# Patient Record
Sex: MALE | Race: WHITE
[De-identification: names, ages, dates, MRNs, and addresses within clinical notes are randomized per-mention and may not be internally consistent; named-entity substitution may affect disease eponyms.]

---

## 2019-12-07 ENCOUNTER — HOSPITAL ENCOUNTER (INPATIENT)
Dept: HOSPITAL 92 - ERS | Age: 79
LOS: 3 days | Discharge: HOME | DRG: 291 | End: 2019-12-10
Attending: INTERNAL MEDICINE | Admitting: INTERNAL MEDICINE
Payer: MEDICARE

## 2019-12-07 VITALS — BODY MASS INDEX: 32.1 KG/M2

## 2019-12-07 DIAGNOSIS — N18.2: ICD-10-CM

## 2019-12-07 DIAGNOSIS — I48.91: ICD-10-CM

## 2019-12-07 DIAGNOSIS — N17.9: ICD-10-CM

## 2019-12-07 DIAGNOSIS — E87.5: ICD-10-CM

## 2019-12-07 DIAGNOSIS — Z79.01: ICD-10-CM

## 2019-12-07 DIAGNOSIS — J44.9: ICD-10-CM

## 2019-12-07 DIAGNOSIS — G47.33: ICD-10-CM

## 2019-12-07 DIAGNOSIS — E66.9: ICD-10-CM
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
## 2019-12-07 DIAGNOSIS — I13.0: Primary | ICD-10-CM

## 2019-12-07 DIAGNOSIS — Z86.73: ICD-10-CM

## 2019-12-07 DIAGNOSIS — I50.23: ICD-10-CM

## 2019-12-07 DIAGNOSIS — I35.1: ICD-10-CM

## 2019-12-07 DIAGNOSIS — E78.5: ICD-10-CM

## 2019-12-07 LAB
ALBUMIN SERPL BCG-MCNC: 3.9 G/DL (ref 3.4–4.8)
ALP SERPL-CCNC: 121 U/L (ref 40–110)
ALT SERPL W P-5'-P-CCNC: 20 U/L (ref 8–55)
ANION GAP SERPL CALC-SCNC: 13 MMOL/L (ref 10–20)
AST SERPL-CCNC: 22 U/L (ref 5–34)
BASOPHILS # BLD AUTO: 0 THOU/UL (ref 0–0.2)
BASOPHILS NFR BLD AUTO: 0.5 % (ref 0–1)
BILIRUB SERPL-MCNC: 1.1 MG/DL (ref 0.2–1.2)
BUN SERPL-MCNC: 14 MG/DL (ref 8.4–25.7)
CALCIUM SERPL-MCNC: 8.7 MG/DL (ref 7.8–10.44)
CHLORIDE SERPL-SCNC: 102 MMOL/L (ref 98–107)
CO2 SERPL-SCNC: 27 MMOL/L (ref 23–31)
CREAT CL PREDICTED SERPL C-G-VRATE: 0 ML/MIN (ref 70–130)
EOSINOPHIL # BLD AUTO: 0.3 THOU/UL (ref 0–0.7)
EOSINOPHIL NFR BLD AUTO: 4.2 % (ref 0–10)
GLOBULIN SER CALC-MCNC: 2.3 G/DL (ref 2.4–3.5)
GLUCOSE SERPL-MCNC: 125 MG/DL (ref 83–110)
HGB BLD-MCNC: 11 G/DL (ref 14–18)
HGB BLD-MCNC: 11.4 G/DL (ref 14–18)
LYMPHOCYTES # BLD: 1.7 THOU/UL (ref 1.2–3.4)
LYMPHOCYTES NFR BLD AUTO: 27.7 % (ref 21–51)
MCH RBC QN AUTO: 31.4 PG (ref 27–31)
MCV RBC AUTO: 94.6 FL (ref 78–98)
MONOCYTES # BLD AUTO: 0.6 THOU/UL (ref 0.11–0.59)
MONOCYTES NFR BLD AUTO: 10.3 % (ref 0–10)
NEUTROPHILS # BLD AUTO: 3.6 THOU/UL (ref 1.4–6.5)
NEUTROPHILS NFR BLD AUTO: 57.3 % (ref 42–75)
PLATELET # BLD AUTO: 151 THOU/UL (ref 130–400)
PLATELET # BLD AUTO: 153 THOU/UL (ref 130–400)
POTASSIUM SERPL-SCNC: 3.8 MMOL/L (ref 3.5–5.1)
RBC # BLD AUTO: 3.65 MILL/UL (ref 4.7–6.1)
SODIUM SERPL-SCNC: 138 MMOL/L (ref 136–145)
TROPONIN I SERPL DL<=0.01 NG/ML-MCNC: (no result) NG/ML (ref ?–0.03)
TROPONIN I SERPL DL<=0.01 NG/ML-MCNC: 0.02 NG/ML (ref ?–0.03)
WBC # BLD AUTO: 6.2 THOU/UL (ref 4.8–10.8)

## 2019-12-07 PROCEDURE — 83880 ASSAY OF NATRIURETIC PEPTIDE: CPT

## 2019-12-07 PROCEDURE — 94760 N-INVAS EAR/PLS OXIMETRY 1: CPT

## 2019-12-07 PROCEDURE — 36415 COLL VENOUS BLD VENIPUNCTURE: CPT

## 2019-12-07 PROCEDURE — 96374 THER/PROPH/DIAG INJ IV PUSH: CPT

## 2019-12-07 PROCEDURE — 94640 AIRWAY INHALATION TREATMENT: CPT

## 2019-12-07 PROCEDURE — 90662 IIV NO PRSV INCREASED AG IM: CPT

## 2019-12-07 PROCEDURE — 80053 COMPREHEN METABOLIC PANEL: CPT

## 2019-12-07 PROCEDURE — G0008 ADMIN INFLUENZA VIRUS VAC: HCPCS

## 2019-12-07 PROCEDURE — 93798 PHYS/QHP OP CAR RHAB W/ECG: CPT

## 2019-12-07 PROCEDURE — 84484 ASSAY OF TROPONIN QUANT: CPT

## 2019-12-07 PROCEDURE — 71045 X-RAY EXAM CHEST 1 VIEW: CPT

## 2019-12-07 PROCEDURE — 90471 IMMUNIZATION ADMIN: CPT

## 2019-12-07 PROCEDURE — 93306 TTE W/DOPPLER COMPLETE: CPT

## 2019-12-07 PROCEDURE — 93005 ELECTROCARDIOGRAM TRACING: CPT

## 2019-12-07 PROCEDURE — 85025 COMPLETE CBC W/AUTO DIFF WBC: CPT

## 2019-12-07 PROCEDURE — 76705 ECHO EXAM OF ABDOMEN: CPT

## 2019-12-07 PROCEDURE — 80048 BASIC METABOLIC PNL TOTAL CA: CPT

## 2019-12-07 RX ADMIN — Medication SCH ML: at 21:00

## 2019-12-07 NOTE — RAD
EXAM: Single view of the chest



HISTORY:   Difficulty breathing



COMPARISON: 2/2/2017



FINDINGS: Single view of the chest shows an enlarged but stable cardiomediastinal silhouette.  The pa
tient is status post sternotomy. There is a pacemaker with its leads in the right atrium and

ventricle..  There appears to be a small left pleural effusion. The bones are unremarkable.



IMPRESSION: Cardiomegaly and left pleural effusion.



Reported By: Sean Youssef 

Electronically Signed:  12/7/2019 7:42 AM

## 2019-12-07 NOTE — ULT
US Abdomen Limited: 12/7/2019 12:11 PM



CLINICAL HISTORY: Abdominal swelling. Evaluate for ascites..



STUDY: Limited four-quadrant ultrasound of the abdomen



COMPARISON: None.                  



FINDINGS:



A small amount of ascites is seen which is most prominent in the left upper quadrant of the abdomen.



IMPRESSION:



Small ascites



Reported By: Sean Youssef 

Electronically Signed:  12/7/2019 12:59 PM

## 2019-12-07 NOTE — HP
CHIEF COMPLAINT:  Shortness of breath.



HISTORY OF PRESENT ILLNESS:  The patient is an 79-year-old male, with a history of

heart failure, recent stroke about 3 weeks ago, who presents to the hospital with

complaints of shortness of breath.  The patient states that last night he had a CPAP

on and he felt that he could not take a deep breath.  At this time, the patient got

concerned and he came into the hospital.  The patient, however, states that he also

was wheezing all over, however, then he also states that he sometimes has

bronchitis.  He denies any fevers or chills.  He recently got out of inpatient rehab

after a stroke.  The patient currently lives in an assisted living with his wife.

Wife, who is at the bedside, states that the patient has been feeling short of

breath and he also has gained significant amount of weight all over.  Denies any

fevers or chills.  He denies any shortness of breath on exertion.  Denies any

orthopnea or PND. 



PAST MEDICAL HISTORY:  Has a history of recent stroke.  I do not have the details

since he was at [facility] for this.  He also has history, which seems to be

like of systolic heart failure.  He also has a history of ventricular tachycardia

and AFib.  He has a history of hyperlipidemia and obesity. 



PAST SURGICAL HISTORY:  He has had cardiac ablation.



SOCIAL HISTORY:  He denies any alcohol use, drug use, or smoking.  He is a full

code.  Lives with his wife. 



REVIEW OF SYSTEMS:  All negative except for the ones mentioned above in the HPI.



ALLERGIES:  HE HAS ALLERGIES TO EPINEPHRINE.  HE STATED THAT CARDIOLOGY HAS ASKED

HIM NOT TO USE IT. 



MEDICATIONS:  He is on; 

1. Allopurinol 100 mg daily.

2. Amiodarone 200 mg daily.

3. Aspirin 81 mg daily.

4. Atorvastatin 40 mg daily.

5. Eliquis 5 mg twice a day.

6. Lasix 40 mg daily.

7. Omeprazole 20 mg daily.

8. Sertraline 50 mg daily.

9. Tamsulosin 0.4 mg daily.



PHYSICAL EXAMINATION:

VITAL SIGNS:  Temperature of 98.8, blood pressure 128/82, he is 97% on room air, and

pulse rate of 80. 

GENERAL:  He is awake, alert, and oriented x3.  Does not appear in any distress. 

HEENT:  Normocephalic, atraumatic.  Pupils are equal and reactive to light. 

NECK:  No lymphadenopathy noted. 

CARDIOVASCULAR:  S1 and S2 present.  No murmurs, rubs, or gallops. 

LUNGS:  Clear to auscultation.  No rhonchi or wheezes noted. 

ABDOMEN:  Obese.  Bowel sounds are present x2.  No pain upon palpation. 

EXTREMITIES:  He does have +2 lower extremity edema. 

NEUROVASCULAR:  Neurovascular-wise, he does have some weakness on his left side of

upper and lower extremities. 

SKIN:  No cuts, lesions, or bruises noted.



LABORATORY RESULTS:  WBCs of 6.2, hemoglobin of 11.4, hematocrit of 34.5, platelets

of 153.  Chemistry; sodium of 138, potassium 3.8, BUN of 14, and creatinine of 1.49.

 His BNP was 175.5.  His troponins have been negative.  Chest x-ray just indicated

some right pleural effusion and cardiomegaly.  His EKG was little strange and

indicated beat of 143, which I do not think that is true.  He is paced. 



ASSESSMENT AND PLAN:  The patient is an 79-year-old male, who presents to the

hospital with complaints of shortness of breath. 

1. Shortness of breath, possible acute on chronic systolic heart failure, very mild.

 The patient recently had a stroke about 3 weeks ago.  I will get records from [facility] and I do not want to repeat the echocardiogram.  I will also interrogate

his implantable cardioverter-defibrillator pacemaker.  I will consult Cardiology

also.  The patient did receive Lasix in the ER.  His blood pressure is very minimum.

 I will get an abdominal ultrasound to see if he has any ascites.  The patient's

wife states that he has gained a significant amount of weight.  He has been watching

his water intake, his salt intake, however, feels that he is still retaining a lot

of fluids. 

2. Recent stroke.  I will continue his Eliquis.  This was in his home medication.

He does have significant left-sided deficits. 

3. Generalized weakness.  We will get Physical Therapy to evaluate the patient.  The

patient's wife stated that he is currently on the move going back to [place] that is

where his family is. 

4. Deep venous thrombosis prophylaxis.  The patient is already on Eliquis.







Job ID:  [id number]

## 2019-12-08 LAB
ANION GAP SERPL CALC-SCNC: 12 MMOL/L (ref 10–20)
BASOPHILS # BLD AUTO: 0 THOU/UL (ref 0–0.2)
BASOPHILS NFR BLD AUTO: 0.6 % (ref 0–1)
BUN SERPL-MCNC: 14 MG/DL (ref 8.4–25.7)
CALCIUM SERPL-MCNC: 9.1 MG/DL (ref 7.8–10.44)
CHLORIDE SERPL-SCNC: 101 MMOL/L (ref 98–107)
CO2 SERPL-SCNC: 27 MMOL/L (ref 23–31)
CREAT CL PREDICTED SERPL C-G-VRATE: 66 ML/MIN (ref 70–130)
EOSINOPHIL # BLD AUTO: 0.2 THOU/UL (ref 0–0.7)
EOSINOPHIL NFR BLD AUTO: 3.7 % (ref 0–10)
GLUCOSE SERPL-MCNC: 110 MG/DL (ref 83–110)
HGB BLD-MCNC: 10.9 G/DL (ref 14–18)
LYMPHOCYTES # BLD: 0.9 THOU/UL (ref 1.2–3.4)
LYMPHOCYTES NFR BLD AUTO: 14.6 % (ref 21–51)
MCH RBC QN AUTO: 31.3 PG (ref 27–31)
MCV RBC AUTO: 94 FL (ref 78–98)
MONOCYTES # BLD AUTO: 0.7 THOU/UL (ref 0.11–0.59)
MONOCYTES NFR BLD AUTO: 11.5 % (ref 0–10)
NEUTROPHILS # BLD AUTO: 4.1 THOU/UL (ref 1.4–6.5)
NEUTROPHILS NFR BLD AUTO: 69.6 % (ref 42–75)
PLATELET # BLD AUTO: 159 THOU/UL (ref 130–400)
POTASSIUM SERPL-SCNC: 3.4 MMOL/L (ref 3.5–5.1)
RBC # BLD AUTO: 3.47 MILL/UL (ref 4.7–6.1)
SODIUM SERPL-SCNC: 137 MMOL/L (ref 136–145)
WBC # BLD AUTO: 5.9 THOU/UL (ref 4.8–10.8)

## 2019-12-08 RX ADMIN — ASPIRIN SCH MG: 81 TABLET ORAL at 08:46

## 2019-12-08 RX ADMIN — Medication SCH ML: at 08:47

## 2019-12-08 RX ADMIN — Medication SCH ML: at 20:56

## 2019-12-08 NOTE — CON
DATE OF CONSULTATION:  12/08/2019



PRIMARY CARDIOLOGIST:  Dr. Fuentes at [facility].



PRIMARY ELECTROPHYSIOLOGIST:  Poncho Richarsdon MD



HISTORY OF PRESENT ILLNESS:  Mr. Chow is an 79-year-old man with congestive heart

failure. 

Mr. Chow is a gentleman who has a long history of congestive heart failure.  He

has been followed at the [facility] system.  Therefore, we do not have a lot of

details.  He complains of increasing shortness of breath.  Also has history of

wheezing.  No chest pain or pressure. 



PAST HISTORY:  

1. History of stroke.

2. History of ventricular tachycardia.

3. History of atrial fibrillation.  He has undergone ablation.



SOCIAL HISTORY:  No alcohol or tobacco.



REVIEW OF SYSTEMS:  Negative except as outlined above.



MEDICATIONS:  

1. Amiodarone 200 mg a day.

2. Aspirin 81 mg a day.

3. Eliquis 5 mg twice a day.

4. Lasix 40 mg a day orally.

5. Omeprazole.

6. Sertraline.

7. Tamsulosin.  He is not on ACE inhibitor or beta blocker.  Reviewing the records,

it looks like he did get hyperkalemia a couple of years ago that it is possibly

related to an ACE inhibitor, but I do not know with any certainty as we do not have

those records.  He said he stopped the beta-blocker, because they made him feel like

he could work out of heart. 



PHYSICAL EXAMINATION:

GENERAL:  This is a pleasant gentleman, in no distress. 

VITAL SIGNS:  Blood pressure 113/70, pulse 80, it is regular. 

HEENT:  Eyes sclerae.  Nonicteric.  Mouth; mucous membranes moist. 

NECK:  Supple.  No lymphadenopathy. 

LUNGS:  Clear.  No wheezing, rales, or rhonchi. 

CARDIAC:  Normal S1, normal S2.  There is no murmur, rub, or gallop. 

ABDOMEN:  Obese, nontender.  No hepatosplenomegaly. 

EXTREMITIES:  Warm, dry. No clubbing or cyanosis.  There is mild peripheral edema.



IMAGING:  Chest x-ray shows cardiomegaly with pulmonary vascular congestion.



ASSESSMENT:  

1. Congestive heart failure, probably systolic, acute on chronic.

2. Previous pacemaker defibrillator.

3. History of atrial fibrillation.

4. Stage 2 renal failure, now it is actually improved with creatinine improved up to

1.3. 



PLAN:  

1. I told him he needs to be on a beta blocker.  We will start low-dose carvedilol.

2. I would recommend a trial of Entresto to see if he can tolerate that from renal

function standpoint, it would improve his prognosis and likely reduce

hospitalization. 

3. He will need to follow up with his cardiologist, Dr. Fuentes as an outpatient.

Since we do not have any records, we will order echocardiogram. 







Job ID:  [id number]

## 2019-12-08 NOTE — PDOC.HOSPP
- Subjective


Encounter Date: 12/08/19


Encounter Time: 09:00


Subjective: 





pt up in bed asleep but easily arousable. 





- Objective


Vital Signs & Weight: 


 Vital Signs (12 hours)











  Temp Pulse Resp BP BP BP Pulse Ox


 


 12/08/19 15:24  97.2 F L  78  15    128/82  92 L


 


 12/08/19 12:00  98.8 F  84  14    113/70  92 L


 


 12/08/19 09:25     125/80  134/72  


 


 12/08/19 07:43  97.6 F  80  16    100/67  94 L














  Pulse Ox


 


 12/08/19 15:24 


 


 12/08/19 12:00 


 


 12/08/19 09:25  95


 


 12/08/19 07:43 








 Weight











Weight                         230 lb 2.601 oz














I&O: 


 











 12/07/19 12/08/19 12/09/19





 06:59 06:59 06:59


 


Intake Total  100 240


 


Output Total  300 


 


Balance  -200 240











Result Diagrams: 


 12/08/19 04:08





 12/08/19 04:08





Hospitalist ROS





- Review of Systems


Cardiovascular: denies: chest pain, palpitations, orthopnea, paroxysmal noc. 

dyspnea, edema, light headedness, other


Gastrointestinal: denies: nausea, vomiting, abdominal pain, diarrhea, 

constipation, melena, hematochezia, other


Genitourinary: denies: dysuria, frequency, incontinence, hematuria, retention, 

other





- Medication


Medications: 


Active Medications











Generic Name Dose Route Start Last Admin





  Trade Name Freq  PRN Reason Stop Dose Admin


 


Amiodarone HCl  200 mg  12/08/19 09:00  12/08/19 08:46





  Cordarone  PO   200 mg





  DAILY PALOMO   Administration





     





     





     





     


 


Apixaban  5 mg  12/07/19 21:00  12/08/19 08:46





  Eliquis  PO   5 mg





  BID PALOMO   Administration





     





     





     





     


 


Aspirin  81 mg  12/08/19 09:00  12/08/19 08:46





  Ecotrin  PO   81 mg





  DAILY PALOMO   Administration





     





     





     





     


 


Benzonatate  100 mg  12/07/19 21:24  12/07/19 21:46





  Tessalon  PO   100 mg





  TIDPRN PRN   Administration





  Cough   





     





     





     


 


Sodium Chloride  10 ml  12/07/19 21:00  12/08/19 08:47





  Flush - Normal Saline  IVF   10 ml





  Q12HR PALOMO   Administration





     





     





     





     


 


Tamsulosin HCl  0.4 mg  12/08/19 09:00  12/08/19 08:47





  Flomax  PO   0.4 mg





  DAILY PALOMO   Administration





     





     





     





     














- Exam


Heart: negative: RRR, no murmur, no gallops, no rubs, normal peripheral pulses, 

irregular, diminshed peripheral pulses, murmur present, II/IV, III/IV


Respiratory: negative: CTAB, no wheezes, no rales, no ronchi, normal chest 

expansion, no tachypnea, normal percussion, rales, rhonchi, tachypneic, wheezes


Gastrointestinal: negative: soft, non-tender, non-distended, normal bowel sounds

, no palpable masses, no hepatomegaly, no splenomegaly, no bruit, no guarding, 

no rigidity, tender to palpation, distended, diminished bowl sounds, voluntary 

guarding


Extremities: 2+ LE edema





Hosp A/P


(1) Acute on chronic systolic (congestive) heart failure


Code(s): I50.23 - ACUTE ON CHRONIC SYSTOLIC (CONGESTIVE) HEART FAILURE   Status

: Acute   





(2) HTN (hypertension)


Code(s): I10 - ESSENTIAL (PRIMARY) HYPERTENSION   Status: Acute   





(3) Obesity


Code(s): E66.9 - OBESITY, UNSPECIFIED   Status: Acute   





(4) Sleep apnea


Code(s): G47.30 - SLEEP APNEA, UNSPECIFIED   Status: Acute   





- Plan





will get records from [facility]. continue diuretics. cardio consulted. 

fluid restriction. will get pt to see pt. He is on eliquis

## 2019-12-09 LAB
ANION GAP SERPL CALC-SCNC: 14 MMOL/L (ref 10–20)
BUN SERPL-MCNC: 17 MG/DL (ref 8.4–25.7)
CALCIUM SERPL-MCNC: 9.2 MG/DL (ref 7.8–10.44)
CHLORIDE SERPL-SCNC: 102 MMOL/L (ref 98–107)
CO2 SERPL-SCNC: 25 MMOL/L (ref 23–31)
CREAT CL PREDICTED SERPL C-G-VRATE: 59 ML/MIN (ref 70–130)
GLUCOSE SERPL-MCNC: 99 MG/DL (ref 83–110)
POTASSIUM SERPL-SCNC: 3.9 MMOL/L (ref 3.5–5.1)
SODIUM SERPL-SCNC: 137 MMOL/L (ref 136–145)

## 2019-12-09 RX ADMIN — Medication SCH ML: at 20:55

## 2019-12-09 RX ADMIN — ASPIRIN SCH MG: 81 TABLET ORAL at 10:04

## 2019-12-09 RX ADMIN — Medication SCH ML: at 10:04

## 2019-12-09 NOTE — PRG
DATE OF SERVICE:  12/09/2019



SUBJECTIVE:  Mr. Chow states he had a good night.  He said he had much less

wheezing, and he feels better today. 



OBJECTIVE:  VITAL SIGNS:  His blood pressure is 130/76, pulse is 80. 

LUNGS:  Clear. 

CARDIAC:  Normal S1, normal S2. 

ABDOMEN:  Soft, nontender. 

EXTREMITIES:  Reveal only mild edema



LABORATORY DATA:  The patient's creatinine is higher today, it is 1.46, it was 1.49

on admission, 1.3 yesterday. 



ASSESSMENT:  

1. Congestive heart failure, probably systolic, echocardiogram pending, acute on

chronic. 

2. Renal failure, slightly worse than yesterday, but the same as 2 days ago.



PLAN:  

1. Hold carvedilol today, he is going to start that, but we will hold off.

2. Continue the Entresto.

3. We will go ahead and give one dose of furosemide later today 20 mg IV.







Job ID:  [id number]

## 2019-12-09 NOTE — PDOC.HOSPP
- Subjective


Encounter Date: 12/09/19


Encounter Time: 09:45


Subjective: 





pt up in bed feels well. 





- Objective


Vital Signs & Weight: 


 Vital Signs (12 hours)











  Temp Pulse Pulse Pulse Resp BP BP


 


 12/09/19 15:26  98.6 F  79    16  


 


 12/09/19 14:56    79  82   124/68  104/61


 


 12/09/19 11:17  98.2 F  81    20  


 


 12/09/19 07:46  97.3 F L  80    20  














  BP Pulse Ox Pulse Ox Pulse Ox


 


 12/09/19 15:26  124/68  94 L  


 


 12/09/19 14:56    94 L  94 L


 


 12/09/19 11:17  120/68  94 L  


 


 12/09/19 07:46  130/76  96  








 Weight











Weight                         232 lb 6.4 oz














I&O: 


 











 12/08/19 12/09/19 12/10/19





 06:59 06:59 06:59


 


Intake Total 100 390 500


 


Output Total 300  


 


Balance -200 390 500











Result Diagrams: 


 12/08/19 04:08





 12/09/19 04:31





Hospitalist ROS





- Review of Systems


Respiratory: denies: cough, dry, shortness of breath, hemoptysis, SOB with 

excertion, pleuritic pain, sputum, wheezing, other


Cardiovascular: denies: chest pain, palpitations, orthopnea, paroxysmal noc. 

dyspnea, edema, light headedness, other





- Medication


Medications: 


Active Medications











Generic Name Dose Route Start Last Admin





  Trade Name Freq  PRN Reason Stop Dose Admin


 


Amiodarone HCl  200 mg  12/08/19 09:00  12/09/19 10:04





  Cordarone  PO   200 mg





  DAILY PALOMO   Administration





     





     





     





     


 


Apixaban  5 mg  12/07/19 21:00  12/09/19 10:04





  Eliquis  PO   5 mg





  BID PALOMO   Administration





     





     





     





     


 


Aspirin  81 mg  12/08/19 09:00  12/09/19 10:04





  Ecotrin  PO   81 mg





  DAILY PALOMO   Administration





     





     





     





     


 


Benzonatate  100 mg  12/07/19 21:24  12/07/19 21:46





  Tessalon  PO   100 mg





  TIDPRN PRN   Administration





  Cough   





     





     





     


 


Sacubitril/Valsartan  1 tab  12/08/19 21:00  12/09/19 10:04





  Entresto 24 Mg-26 Mg Tablet  PO   1 tab





  BID PALOMO   Administration





     





     





     





     


 


Sodium Chloride  10 ml  12/07/19 21:00  12/09/19 10:04





  Flush - Normal Saline  IVF   10 ml





  Q12HR PALOMO   Administration





     





     





     





     


 


Tamsulosin HCl  0.4 mg  12/08/19 09:00  12/09/19 10:04





  Flomax  PO   0.4 mg





  DAILY PALOMO   Administration





     





     





     





     














- Exam


Heart: negative: RRR, no murmur, no gallops, no rubs, normal peripheral pulses, 

irregular, diminshed peripheral pulses, murmur present, II/IV, III/IV


Respiratory: negative: CTAB, no wheezes, no rales, no ronchi, normal chest 

expansion, no tachypnea, normal percussion, rales, rhonchi, tachypneic, wheezes


Gastrointestinal: negative: soft, non-tender, non-distended, normal bowel sounds

, no palpable masses, no hepatomegaly, no splenomegaly, no bruit, no guarding, 

no rigidity, tender to palpation, distended, diminished bowl sounds, voluntary 

guarding





Hosp A/P


(1) Acute on chronic systolic (congestive) heart failure


Code(s): I50.23 - ACUTE ON CHRONIC SYSTOLIC (CONGESTIVE) HEART FAILURE   Status

: Acute   





(2) HTN (hypertension)


Code(s): I10 - ESSENTIAL (PRIMARY) HYPERTENSION   Status: Acute   





(3) Obesity


Code(s): E66.9 - OBESITY, UNSPECIFIED   Status: Acute   





(4) Sleep apnea


Code(s): G47.30 - SLEEP APNEA, UNSPECIFIED   Status: Acute   





- Plan





will get records from [facility]. continue diuretics. cardio consulted. 

fluid restriction. will get pt to see pt. He is on eliquis





12/9 pt feels well, is sleeping well. echo indicated low ef. pending records 

from [facility]. pt on entresto.

## 2019-12-10 VITALS — DIASTOLIC BLOOD PRESSURE: 69 MMHG | TEMPERATURE: 98.2 F | SYSTOLIC BLOOD PRESSURE: 112 MMHG

## 2019-12-10 LAB
ANION GAP SERPL CALC-SCNC: 9 MMOL/L (ref 10–20)
BUN SERPL-MCNC: 17 MG/DL (ref 8.4–25.7)
CALCIUM SERPL-MCNC: 8.6 MG/DL (ref 7.8–10.44)
CHLORIDE SERPL-SCNC: 105 MMOL/L (ref 98–107)
CO2 SERPL-SCNC: 28 MMOL/L (ref 23–31)
CREAT CL PREDICTED SERPL C-G-VRATE: 63 ML/MIN (ref 70–130)
GLUCOSE SERPL-MCNC: 107 MG/DL (ref 83–110)
POTASSIUM SERPL-SCNC: 3.4 MMOL/L (ref 3.5–5.1)
SODIUM SERPL-SCNC: 139 MMOL/L (ref 136–145)

## 2019-12-10 RX ADMIN — Medication SCH ML: at 08:46

## 2019-12-10 RX ADMIN — ASPIRIN SCH MG: 81 TABLET ORAL at 08:33

## 2019-12-11 NOTE — DIS
DATE OF ADMISSION:  2019



DATE OF DISCHARGE:  12/10/2019



DISCHARGE DIAGNOSES:  

1. Acute on chronic systolic heart failure.

2. Shortness of breath.

3. Falls.

4. Sleep apnea.

5. Obesity.

6. Mild acute kidney injury.



HOSPITAL COURSE:  The patient is a very pleasant 79-year-old male, who recently had

a stroke about a couple of weeks ago, was treated at [facility], who presented

to the hospital with shortness of breath.  Initially, the patient stated that he had

some issues with a CPAP machine; however, after talking to the patient's family

members, stated that he has been having ongoing shortness of breath.  The patient

initially was put on IV diuretics.  He was seen by Cardiology and an echocardiogram

was ordered. 

Upon further talking to the patient's family, the patient's daughter in-law, she

stated that the patient has been actually recommending his own treatment when he has

been going to his physicians.  According to the patient's daughter-in-law, he has

not been taking his medications as prescribed.  She thinks that is probably most

likely why he had a stroke.  The patient does have a history of atrial fibrillation.

 The patient at this time was optimized on his medications.  He was started on

Entresto and also was given IV diuretics which he had great effect with.  The

patient felt better.  At this time, he was discharged home.  He will follow up with

his cardiologist, he has an appointment on Thursday.  His EF indicated 15% to 20%,

paradoxical septal motion with left atrium is severely dilated.  Mild mitral

regurgitation is present and he had some mild aortic insufficiency.  During his

hospital stay, his creatinine did go up a little bit; however, on discharge, he was

trending downward.  His last creatinine on discharge was 1.37.  Again, this was most

likely secondary to his diuretics and also patient being started on Entresto. 



HOME MEDICATIONS:  Will be as of the followin. Sertraline one tablet daily.

2. Allopurinol 2 tablets daily.

3. Atorvastatin 40 mg at bedtime.

4. Eliquis 5 mg b.i.d.

5. Tamsulosin one capsule p.o. daily.

6. Amiodarone 200 mg daily.

7. Aspirin 81 mg daily.

8. Torsemide 20 mg daily.

9. Entresto 1 tablet p.o. b.i.d.



PHYSICAL EXAMINATION:

VITAL SIGNS:  Temperature 98.2, pulse 82, respirations 20, 92%, and blood pressure

112/69. 

GENERAL:  He is awake, alert, and oriented x3.  Does not appear in distress. 

CV:  S1 and S2 present.  No murmurs, rubs, or gallops. 

ABDOMEN:  Soft and nontender.  Bowel sounds are present x2. 

EXTREMITIES:  No edema. 



Again, the patient will be discharged home.  He will follow up with his primary and

also with his cardiologist. 







Job ID:  [id number]